# Patient Record
Sex: FEMALE | Race: BLACK OR AFRICAN AMERICAN | NOT HISPANIC OR LATINO | Employment: UNEMPLOYED | URBAN - METROPOLITAN AREA
[De-identification: names, ages, dates, MRNs, and addresses within clinical notes are randomized per-mention and may not be internally consistent; named-entity substitution may affect disease eponyms.]

---

## 2018-08-13 ENCOUNTER — OFFICE VISIT (OUTPATIENT)
Dept: FAMILY MEDICINE | Facility: CLINIC | Age: 23
End: 2018-08-13
Payer: MEDICAID

## 2018-08-13 VITALS
TEMPERATURE: 98 F | OXYGEN SATURATION: 98 % | RESPIRATION RATE: 16 BRPM | HEART RATE: 66 BPM | BODY MASS INDEX: 27.31 KG/M2 | DIASTOLIC BLOOD PRESSURE: 69 MMHG | HEIGHT: 67 IN | SYSTOLIC BLOOD PRESSURE: 104 MMHG | WEIGHT: 174 LBS

## 2018-08-13 DIAGNOSIS — G43.009 MIGRAINE WITHOUT AURA AND WITHOUT STATUS MIGRAINOSUS, NOT INTRACTABLE: ICD-10-CM

## 2018-08-13 DIAGNOSIS — J45.40 MODERATE PERSISTENT ASTHMA WITHOUT COMPLICATION: Primary | ICD-10-CM

## 2018-08-13 RX ORDER — ALBUTEROL SULFATE 0.83 MG/ML
2.5 SOLUTION RESPIRATORY (INHALATION) EVERY 6 HOURS PRN
Qty: 25 VIAL | Refills: 1 | Status: SHIPPED | OUTPATIENT
Start: 2018-08-13

## 2018-08-13 RX ORDER — KETOROLAC TROMETHAMINE 30 MG/ML
60 INJECTION, SOLUTION INTRAMUSCULAR; INTRAVENOUS ONCE
Qty: 2 ML | Refills: 0 | OUTPATIENT
Start: 2018-08-13 | End: 2018-08-13

## 2018-08-13 RX ORDER — ALBUTEROL SULFATE 90 UG/1
2 AEROSOL, METERED RESPIRATORY (INHALATION) EVERY 6 HOURS PRN
Qty: 2 INHALER | Refills: 3 | Status: SHIPPED | OUTPATIENT
Start: 2018-08-13

## 2018-08-13 RX ORDER — FLUTICASONE PROPIONATE 220 UG/1
1 AEROSOL, METERED RESPIRATORY (INHALATION) 2 TIMES DAILY
Qty: 1 INHALER | Refills: 1 | Status: SHIPPED | OUTPATIENT
Start: 2018-08-13

## 2018-08-13 NOTE — NURSING NOTE
The following medication was given:     MEDICATION: Ketorolac Tromethamine 60MG/2ML (30 mg/mL) (Toradol)  ROUTE: IM  SITE: Deltoid - Right  DOSE: 60 mL per 2ml   LOT #: [id number]  :  youcalc  EXPIRATION DATE:  11 19  NDC#: 0641-.376-25   Medication administered by: Savannah Perez was available on site at the time of this service.

## 2018-08-13 NOTE — MR AVS SNAPSHOT
"              After Visit Summary   2018    Kj Franklin    MRN: [id number]           Patient Information     Date Of Birth          1995        Visit Information        Provider Department      2018 9:00 AM Jessi Daly MD [facility]        Today's Diagnoses     Moderate persistent asthma without complication    -  1      Care Instructions    For asthma:  -Start taking the Flovent 1 puff twice daily. Wash mouth out after.              Follow-ups after your visit        Follow-up notes from your care team     Return in about 2 weeks (around 2018).      Who to contact     Please call your clinic at [phone] to:    Ask questions about your health    Make or cancel appointments    Discuss your medicines    Learn about your test results    Speak to your doctor            Additional Information About Your Visit        MyChart Information     [employer] is an electronic gateway that provides easy, online access to your medical records. With [employer], you can request a clinic appointment, read your test results, renew a prescription or communicate with your care team.     To sign up for [employer] visit the website at [email or web address]   You will be asked to enter the access code listed below, as well as some personal information. Please follow the directions to create your username and password.     Your access code is: [email or web address]  Expires: 2018 10:05 AM     Your access code will  in 90 days. If you need help or a new code, please contact your [facility] or call [phone] for assistance.        Care EveryWhere ID     This is your Care EveryWhere ID. This could be used by other organizations to access your [facility] medical records  [id number]        Your Vitals Were     Pulse Temperature Respirations Height Pulse Oximetry BMI (Body Mass Index)    66 98  F (36.7  C) (Oral) 16 5' 7\" (170.2 cm) 98% 27.25 kg/m2       Blood Pressure " from Last 3 Encounters:   08/13/18 104/69    Weight from Last 3 Encounters:   08/13/18 174 lb (78.9 kg)              Today, you had the following     No orders found for display         Today's Medication Changes          These changes are accurate as of 8/13/18 10:05 AM.  If you have any questions, ask your nurse or doctor.               Start taking these medicines.        Dose/Directions    * albuterol 108 (90 Base) MCG/ACT inhaler   Commonly known as:  PROAIR HFA/PROVENTIL HFA/VENTOLIN HFA   Used for:  Moderate persistent asthma without complication   Started by:  Jessi Daly MD        Dose:  2 puff   Inhale 2 puffs into the lungs every 6 hours as needed for shortness of breath / dyspnea or wheezing   Quantity:  2 Inhaler   Refills:  3       * albuterol (2.5 MG/3ML) 0.083% neb solution   Used for:  Moderate persistent asthma without complication   Started by:  Jessi Daly MD        Dose:  2.5 mg   Take 1 vial (2.5 mg) by nebulization every 6 hours as needed for shortness of breath / dyspnea or wheezing   Quantity:  25 vial   Refills:  1       fluticasone 220 MCG/ACT Inhaler   Commonly known as:  FLOVENT HFA   Used for:  Moderate persistent asthma without complication   Started by:  Jessi Daly MD        Dose:  1 puff   Inhale 1 puff into the lungs 2 times daily   Quantity:  1 Inhaler   Refills:  1       * Notice:  This list has 2 medication(s) that are the same as other medications prescribed for you. Read the directions carefully, and ask your doctor or other care provider to review them with you.         Where to get your medicines      These medications were sent to [facility] - [place], [place] - [address], [place] [address]     Phone:  [phone]     albuterol (2.5 MG/3ML) 0.083% neb solution    albuterol 108 (90 Base) MCG/ACT inhaler    fluticasone 220 MCG/ACT Inhaler                Primary Care Provider Office Phone # Fax #     Jessi Daly [phone]       [facility] [address] [place] [address]        Equal Access to Services     ALBA FRANCIS : Hadii aad ku hadjeremiahduane Eng, isakda virginiamicheleha, gilsonta kasarina johnraj, ranulfo donato johnenrique orozco savannah pollard. So [facility] [phone].    ATENCIÓN: Si habla español, tiene a cohen disposición servicios gratuitos de asistencia lingüística. Llame al [phone].    We comply with applicable federal civil rights laws and [place] laws. We do not discriminate on the basis of race, color, national origin, age, disability, sex, sexual orientation, or gender identity.            Thank you!     Thank you for choosing [facility]  for your care. Our goal is always to provide you with excellent care. Hearing back from our patients is one way we can continue to improve our services. Please take a few minutes to complete the written survey that you may receive in the mail after your visit with us. Thank you!             Your Updated Medication List - Protect others around you: Learn how to safely use, store and throw away your medicines at www.disposemymeds.org.          This list is accurate as of 8/13/18 10:05 AM.  Always use your most recent med list.                   Brand Name Dispense Instructions for use Diagnosis    * albuterol 108 (90 Base) MCG/ACT inhaler    PROAIR HFA/PROVENTIL HFA/VENTOLIN HFA    2 Inhaler    Inhale 2 puffs into the lungs every 6 hours as needed for shortness of breath / dyspnea or wheezing    Moderate persistent asthma without complication       * albuterol (2.5 MG/3ML) 0.083% neb solution     25 vial    Take 1 vial (2.5 mg) by nebulization every 6 hours as needed for shortness of breath / dyspnea or wheezing    Moderate persistent asthma without complication       fluticasone 220 MCG/ACT Inhaler    FLOVENT HFA    1 Inhaler    Inhale 1 puff into the lungs 2 times daily    Moderate persistent asthma without complication        * Notice:  This list has 2 medication(s) that are the same as other medications prescribed for you. Read the directions carefully, and ask your doctor or other care provider to review them with you.

## 2018-08-13 NOTE — PROGRESS NOTES
"Chief Complaint   Patient presents with     Asthma     Pt is here to recheck asthma     other     Pt had misscariage 2 weeks ago.      other     Headache on right side of head for the past 4 days              SUBJECTIVE       Kj Franklin is a 23 year old  female with a PMH significant for:     Patient Active Problem List   Diagnosis     Moderate persistent asthma without complication     She presents for establishing care and a few complaints.     1. Asthma. Worsened lately because rains, season change, and out of medications. She has albuterol, but thinks that her ventolin works less well than when she had the proair. Also uses nebs at night. She has no controller medication and states she used to take something else when she was younger. Used to use a spacer when she was younger but states she does not want one. Asthma is always worse in this time of year and the winter. No associated rhinorrhea or cough.  Has been hospitalized multiple times, last was last year.     Tobacco use: 3 per day. Trying to cut down.    2. Had a miscarriage 2 weeks ago. Having some nipple pain last night. She is not trying to become pregnant. Not interested in birth control. Wondering when she will have a menstrual cycle again.    3. Migraines. Started 5 days ago. On and off. Right sided, pulsating. Light and sound sensitivity. Has used 60 mg Ibuprofen at a time. No meds today. Tried tylenol as well.    4. Has been on Keflex for UTI. 3 pills left    PMH, Medications and Allergies were reviewed with patient.    Review of systems: Patient denies fever or chills.  States she has had blurry vision for long-standing, has an ophthalmology appointment later this week.  Denies nausea or vomiting, the pain.  Is currently short of breath.  Denies rash.  Denies further vaginal bleeding or abdominal pain.        OBJECTIVE       Physical Exam:  /69  Pulse 66  Temp 98  F (36.7  C) (Oral)  Resp 16  Ht 5' 7\" (170.2 cm)  Wt 174 lb (78.9 kg) "  SpO2 98%  BMI 27.25 kg/m2  Body mass index is 27.25 kg/(m^2).      General: Adult female sitting on exam table, NAD  HEENT: Atraumatic, PERRL, TM clear without bulging  Neck: Supple  CV: RRR, no m/r/g  Pulm: CTAB, no wheezing, rhonchi or crackles  Abdm: Soft, NT  Psych: Calm, normal affect    ACT: 9    ASSESSMENT AND PLAN     Kj was seen today for asthma, other and other.    Diagnoses and all orders for this visit:    Moderate persistent asthma without complication.  Patient presents for multiple complaints, establishing care, and review of her asthma.  ACT score today is 9, very poorly controlled asthma.  Patient has daily use of her albuterol, with frequent nighttime awakenings due to shortness of breath.  She normally uses albuterol but is out of this.  Also notes at this time of year as well as the winter always worst for her asthma.  Patient with moderate persistent asthma, believe she would benefit from a controller medication.  With this category she would be a candidate for medium dose inhaled corticosteroids will start her on Flovent 1 puff in the morning and at night.  We will also refill her albuterol in her nebulizer with goal of not needing to use the albuterol very frequently.  Want her to follow-up in 2 weeks for repeat ACT.  -     albuterol (PROAIR HFA/PROVENTIL HFA/VENTOLIN HFA) 108 (90 Base) MCG/ACT inhaler; Inhale 2 puffs into the lungs every 6 hours as needed for shortness of breath / dyspnea or wheezing  -     albuterol (2.5 MG/3ML) 0.083% neb solution; Take 1 vial (2.5 mg) by nebulization every 6 hours as needed for shortness of breath / dyspnea or wheezing  -     fluticasone (FLOVENT HFA) 220 MCG/ACT Inhaler; Inhale 1 puff into the lungs 2 times daily    Migraine without aura and without status migrainosus, not intractable.  5 days of pulsing right-sided headache on and off associated with light and sound sensitivity.  Will give patient a Toradol shot here in clinic as well as counseled  on use of Tylenol and later today ibuprofen.  Patient does not have chronic migraines so do not believe she would benefit from any further migraine medication at this point will reassess in the future.  -     INJECTION INTRAMUSCULAR OR SUB-Q  -     ketorolac (TORADOL) IM injection 60 mg (Charge)  -     ketorolac (TORADOL) 60 MG/2ML SOLN injection; Inject 2 mLs (60 mg) into the muscle once for 1 dose      RTC in 2 weeks for follow up of asthma or sooner if develops new or worsening symptoms.    I precepted with Dr. Chris Daly MD  PGY-3, [facility]   Pager: [phone]

## 2018-08-13 NOTE — PROGRESS NOTES
Preceptor Attestation:   Patient seen, evaluated and discussed with the resident. I have verified the content of the note, which accurately reflects my assessment of the patient and the plan of care.   Supervising Physician:  Anthony Perez MD

## 2018-08-14 ENCOUNTER — HEALTH MAINTENANCE LETTER (OUTPATIENT)
Age: 23
End: 2018-08-14

## 2018-08-14 ASSESSMENT — ASTHMA QUESTIONNAIRES: ACT_TOTALSCORE: 9

## 2024-08-21 ENCOUNTER — OFFICE VISIT (OUTPATIENT)
Dept: URGENT CARE | Facility: URGENT CARE | Age: 29
End: 2024-08-21

## 2024-08-21 VITALS
SYSTOLIC BLOOD PRESSURE: 108 MMHG | TEMPERATURE: 98 F | WEIGHT: 180 LBS | BODY MASS INDEX: 28.25 KG/M2 | DIASTOLIC BLOOD PRESSURE: 78 MMHG | HEART RATE: 73 BPM | RESPIRATION RATE: 16 BRPM | OXYGEN SATURATION: 99 % | HEIGHT: 67 IN

## 2024-08-21 DIAGNOSIS — Z11.3 SCREEN FOR STD (SEXUALLY TRANSMITTED DISEASE): ICD-10-CM

## 2024-08-21 DIAGNOSIS — B37.31 YEAST VAGINITIS: Primary | ICD-10-CM

## 2024-08-21 DIAGNOSIS — Z72.51 UNPROTECTED SEX: ICD-10-CM

## 2024-08-21 DIAGNOSIS — N76.0 BACTERIAL VAGINOSIS: ICD-10-CM

## 2024-08-21 DIAGNOSIS — B96.89 BACTERIAL VAGINOSIS: ICD-10-CM

## 2024-08-21 LAB
ALBUMIN UR-MCNC: NEGATIVE MG/DL
APPEARANCE UR: CLEAR
BILIRUB UR QL STRIP: NEGATIVE
CLUE CELLS: PRESENT
COLOR UR AUTO: YELLOW
GLUCOSE UR STRIP-MCNC: NEGATIVE MG/DL
HGB UR QL STRIP: NEGATIVE
HIV 1+2 AB+HIV1 P24 AG SERPL QL IA: NONREACTIVE
KETONES UR STRIP-MCNC: NEGATIVE MG/DL
LEUKOCYTE ESTERASE UR QL STRIP: NEGATIVE
NITRATE UR QL: NEGATIVE
PH UR STRIP: 6.5 [PH] (ref 5–7)
SP GR UR STRIP: 1.01 (ref 1–1.03)
T PALLIDUM AB SER QL: NONREACTIVE
TRICHOMONAS, WET PREP: ABNORMAL
UROBILINOGEN UR STRIP-ACNC: 0.2 E.U./DL
WBC'S/HIGH POWER FIELD, WET PREP: ABNORMAL
YEAST, WET PREP: PRESENT

## 2024-08-21 PROCEDURE — 87591 N.GONORRHOEAE DNA AMP PROB: CPT | Performed by: INTERNAL MEDICINE

## 2024-08-21 PROCEDURE — 87210 SMEAR WET MOUNT SALINE/INK: CPT | Performed by: INTERNAL MEDICINE

## 2024-08-21 PROCEDURE — 86780 TREPONEMA PALLIDUM: CPT | Performed by: INTERNAL MEDICINE

## 2024-08-21 PROCEDURE — 87491 CHLMYD TRACH DNA AMP PROBE: CPT | Performed by: INTERNAL MEDICINE

## 2024-08-21 PROCEDURE — 36415 COLL VENOUS BLD VENIPUNCTURE: CPT | Performed by: INTERNAL MEDICINE

## 2024-08-21 PROCEDURE — 81003 URINALYSIS AUTO W/O SCOPE: CPT | Performed by: INTERNAL MEDICINE

## 2024-08-21 PROCEDURE — 87389 HIV-1 AG W/HIV-1&-2 AB AG IA: CPT | Performed by: INTERNAL MEDICINE

## 2024-08-21 PROCEDURE — 99204 OFFICE O/P NEW MOD 45 MIN: CPT | Performed by: INTERNAL MEDICINE

## 2024-08-21 RX ORDER — FLUCONAZOLE 150 MG/1
150 TABLET ORAL ONCE
Qty: 1 TABLET | Refills: 0 | Status: SHIPPED | OUTPATIENT
Start: 2024-08-21 | End: 2024-08-21

## 2024-08-21 RX ORDER — METRONIDAZOLE 500 MG/1
500 TABLET ORAL 2 TIMES DAILY
Qty: 14 TABLET | Refills: 0 | Status: SHIPPED | OUTPATIENT
Start: 2024-08-21 | End: 2024-08-28

## 2024-08-21 NOTE — PROGRESS NOTES
"ASSESSMENT AND PLAN:      ICD-10-CM    1. Yeast vaginitis  B37.31 fluconazole (DIFLUCAN) 150 MG tablet      2. Unprotected sex  Z72.51 Wet prep - Clinic Collect     NEISSERIA GONORRHOEA PCR     CHLAMYDIA TRACHOMATIS PCR     UA Macroscopic with reflex to Microscopic and Culture - Clinic Collect      3. Screen for STD (sexually transmitted disease)  Z11.3 Wet prep - Clinic Collect     NEISSERIA GONORRHOEA PCR     CHLAMYDIA TRACHOMATIS PCR     UA Macroscopic with reflex to Microscopic and Culture - Clinic Collect     HIV Antigen Antibody Combo     Treponema Abs w Reflex to RPR and Titer      4. Bacterial vaginosis  N76.0 metroNIDAZOLE (FLAGYL) 500 MG tablet    B96.89         PLAN:  Discussed boric acid vag suppositories  STD results on my chart      Return if symptoms worsen or fail to improve.        Candie Pineda MD  [facility]    Subjective     Kj Franklin is a 29 year old who presents for Patient presents with:  Urgent Care: Had intercourse 2 weeks ago and condom broke. Period ended 2 days ago. Would like to be tested for uti, yeast infection, bv or std.     a new patient of [facility].    GYN Complaint        Onset of symptoms was 2 week(s) ago.  Current and Associated symptoms:   had vaginal discharge that was similar to yeast 2 weeks ago prior to menstrual cycle  then had her menstrual cycle,still some spotting    Would like STD screening.  [place] 2 weeks ago before menstrual cycle.  Condom broke    Has vaginal odor  Pink spotting    Treatment measures tried include:  water  Sexually active: yes, single partner,   Predisposing factors: None  Hx of previous symptom: yes    Review of Systems        Objective    /78   Pulse 73   Temp 98  F (36.7  C) (Temporal)   Resp 16   Ht 1.702 m (5' 7\")   Wt 81.6 kg (180 lb)   SpO2 99%   BMI 28.19 kg/m    Physical Exam  Vitals reviewed.   Constitutional:       Appearance: Normal appearance. She is not ill-appearing. "   Neurological:      Mental Status: She is alert.            Results for orders placed or performed in visit on 08/21/24 (from the past 24 hour(s))   Wet prep - Clinic Collect    Specimen: Vagina; Swab   Result Value Ref Range    Trichomonas Absent Absent    Yeast Present (A) Absent    Clue Cells Present (A) Absent    WBCs/high power field None None   UA Macroscopic with reflex to Microscopic and Culture - Clinic Collect    Specimen: Urine, Midstream   Result Value Ref Range    Color Urine Yellow Colorless, Straw, Light Yellow, Yellow    Appearance Urine Clear Clear    Glucose Urine Negative Negative mg/dL    Bilirubin Urine Negative Negative    Ketones Urine Negative Negative mg/dL    Specific Gravity Urine 1.015 1.003 - 1.035    Blood Urine Negative Negative    pH Urine 6.5 5.0 - 7.0    Protein Albumin Urine Negative Negative mg/dL    Urobilinogen Urine 0.2 0.2, 1.0 E.U./dL    Nitrite Urine Negative Negative    Leukocyte Esterase Urine Negative Negative    Narrative    Microscopic not indicated

## 2024-08-22 LAB
C TRACH DNA SPEC QL NAA+PROBE: NEGATIVE
N GONORRHOEA DNA SPEC QL NAA+PROBE: NEGATIVE

## 2024-09-15 ENCOUNTER — HEALTH MAINTENANCE LETTER (OUTPATIENT)
Age: 29
End: 2024-09-15